# Patient Record
Sex: FEMALE | Race: WHITE | NOT HISPANIC OR LATINO | Employment: FULL TIME | URBAN - NONMETROPOLITAN AREA
[De-identification: names, ages, dates, MRNs, and addresses within clinical notes are randomized per-mention and may not be internally consistent; named-entity substitution may affect disease eponyms.]

---

## 2024-02-20 ENCOUNTER — OUTSIDE FACILITY SERVICE (OUTPATIENT)
Dept: CARDIOLOGY | Facility: CLINIC | Age: 39
End: 2024-02-20
Payer: COMMERCIAL

## 2024-02-20 ENCOUNTER — TELEPHONE (OUTPATIENT)
Dept: FAMILY MEDICINE CLINIC | Facility: CLINIC | Age: 39
End: 2024-02-20
Payer: COMMERCIAL

## 2024-02-20 PROCEDURE — 93010 ELECTROCARDIOGRAM REPORT: CPT | Performed by: INTERNAL MEDICINE

## 2024-02-20 NOTE — TELEPHONE ENCOUNTER
I called patient to make sure she was going to the er she stated she was still working then planned on going I told her if she would like to make an appt or needs anything to give us a call back

## 2024-02-20 NOTE — TELEPHONE ENCOUNTER
"Caller: Becky Grewal    Relationship to patient: Self    Best call back number: [phone]     Chief complaint: SHORTNESS OF BREATH, \"I feel like I'm going to have a heart attack\"    Patient directed to call 911 or go to their nearest emergency room.     Patient verbalized understanding: [x] Yes  [] No  If no, why?    Additional notes: PATIENT CALLED IN TO ESTABLISH CARE WITH THE OFFICE. AN APPOINTMENT WAS SCHEDULED, BUT THE PATIENT STATED THAT SHE WAS HAVING SHORTNESS OF BREATH AND THAT SHE \"COULDN'T GET A FULL BREATH IN\"    PATIENT STATED THAT SHE WOULD BE GOING TO HER NEAREST EMERGENCY ROOM AND ENDED CALL    PLEASE BE ADVISED    "

## 2024-02-22 ENCOUNTER — OFFICE VISIT (OUTPATIENT)
Dept: CARDIOLOGY | Facility: CLINIC | Age: 39
End: 2024-02-22
Payer: COMMERCIAL

## 2024-02-22 VITALS
OXYGEN SATURATION: 99 % | HEIGHT: 62 IN | BODY MASS INDEX: 33.68 KG/M2 | SYSTOLIC BLOOD PRESSURE: 150 MMHG | RESPIRATION RATE: 16 BRPM | DIASTOLIC BLOOD PRESSURE: 100 MMHG | HEART RATE: 95 BPM | WEIGHT: 183 LBS

## 2024-02-22 DIAGNOSIS — K52.9 CHRONIC DIARRHEA: ICD-10-CM

## 2024-02-22 DIAGNOSIS — I25.10 CORONARY ARTERY CALCIFICATION SEEN ON CT SCAN: ICD-10-CM

## 2024-02-22 DIAGNOSIS — R07.9 CHEST PAIN, UNSPECIFIED TYPE: Primary | ICD-10-CM

## 2024-02-22 DIAGNOSIS — R00.2 PALPITATIONS: ICD-10-CM

## 2024-02-22 DIAGNOSIS — R06.02 SHORTNESS OF BREATH: ICD-10-CM

## 2024-02-22 PROCEDURE — 99204 OFFICE O/P NEW MOD 45 MIN: CPT | Performed by: INTERNAL MEDICINE

## 2024-02-22 PROCEDURE — 93000 ELECTROCARDIOGRAM COMPLETE: CPT | Performed by: INTERNAL MEDICINE

## 2024-02-22 RX ORDER — LISINOPRIL 5 MG/1
5 TABLET ORAL DAILY
Qty: 90 TABLET | Refills: 3 | Status: SHIPPED | OUTPATIENT
Start: 2024-02-22

## 2024-02-22 RX ORDER — POTASSIUM CHLORIDE 750 MG/1
10 TABLET, FILM COATED, EXTENDED RELEASE ORAL DAILY
Qty: 90 TABLET | Refills: 3 | Status: SHIPPED | OUTPATIENT
Start: 2024-02-22

## 2024-02-22 NOTE — PROGRESS NOTES
[facility]  [facility] CARDIOLOGY  [address] [place] [address]  Dept: [phone]  Dept Fax: [phone]    Date: 02/22/2024  Patient: Becky Grewal  YOB: 1985    New Patient Office Note    Consult Reason:  Ms. Becky Grewal is a 38 y.o. female who presents to the clinic to establish care, seen for Shortness of Breath and Chest Pain.   Patient was seen at [facility] emergency room 2 days ago for chest pain.  Workup negative for pulm embolism.  Potassium was low and blood pressure was elevated.  Patient here for follow-up on chest pain.  Patient admits decreased exercise capacity limited by shortness of breath 1 flight of stairs.  Patient admits to chest pains on and off, not necessarily related to exercise, substernal, not respirophasic.  Patient admits bilateral right and left flank pains worse with deep breathing or movement.  Patient notes palpitations at rest type extra beats and palpitations lasting few seconds to a minute.  Patient denies syncope, but admits occasional lightheadedness.  Patient drinks 1/5 every day.  Patient is a smoker, usually coupled with drinking.  Patient with family history of early onset CAD from mother and father side.    The following portions of the patient's history were reviewed and updated as appropriate: allergies, current medications, past family history, past medical history, past social history, past surgical history, and problem list.    Medications:   Allergies   Allergen Reactions    Codeine Rash      Current Outpatient Medications   Medication Instructions    lisinopril (PRINIVIL,ZESTRIL) 5 mg, Oral, Daily    potassium chloride 10 MEQ CR tablet 10 mEq, Oral, Daily       Subjective  History reviewed. No pertinent past medical history.    History reviewed. No pertinent surgical history.    Family History   Problem Relation Age of Onset    Heart disease Maternal Grandfather     Heart attack Maternal Grandfather     Heart  "disease Paternal Grandfather     Heart attack Paternal Grandfather         Social History     Socioeconomic History    Marital status:    Tobacco Use    Smoking status: Every Day     Packs/day: .5     Types: Cigarettes     Start date: 1999     Passive exposure: Current    Smokeless tobacco: Never   Vaping Use    Vaping Use: Never used   Substance and Sexual Activity    Alcohol use: Yes     Comment: a fith a night whiskey    Drug use: Not Currently    Sexual activity: Defer       Objective  Vitals:    02/22/24 0913   BP: 150/100   BP Location: Right arm   Patient Position: Sitting   Pulse: 95   Resp: 16   SpO2: 99%   Weight: 83 kg (183 lb)   Height: 157.5 cm (62\")     Vitals:    02/22/24 0913   BP: 150/100   BP Location: Right arm   Patient Position: Sitting   Pulse: 95   Resp: 16   SpO2: 99%   Weight: 83 kg (183 lb)   Height: 157.5 cm (62\")        Physical Exam  Constitutional:       Appearance: Healthy appearance. Not in distress.   Eyes:      Pupils: Pupils are equal, round, and reactive to light.   HENT:    Mouth/Throat:      Mouth: Mucous membranes are moist.   Neck:      Vascular: No carotid bruit, hepatojugular reflux, JVD or JVR. JVD normal.   Pulmonary:      Effort: Pulmonary effort is normal.      Breath sounds: Normal breath sounds. No wheezing. No rhonchi. No rales.   Chest:      Chest wall: Not tender to palpatation.   Cardiovascular:      PMI at left midclavicular line. Normal rate. Regular rhythm. Normal S1 with normal intensity. Normal S2 with normal intensity.       Murmurs: There is no murmur.      No gallop.  No click. No rub.   Pulses:     Carotid: 4+ bilaterally.     Radial: 4+ bilaterally.     Popliteal: 4+ bilaterally.     Dorsalis pedis: 4+ bilaterally.  Edema:     Peripheral edema absent.   Abdominal:      General: There is no abdominal bruit.   Skin:     General: Skin is warm.   Neurological:      Mental Status: Alert and oriented to person, place and time.              Labs:  No " "results found for: \"NA\", \"K\", \"CL\", \"CO2\", \"MG\", \"BUN\", \"CREATININE\", \"CALCIUM\", \"BILITOT\", \"ALKPHOS\", \"ALT\", \"AST\", \"GLUCOSE\", \"ALBUMIN\", \"PROTEIN\"  No results found for: \"WBC\", \"HGB\", \"HCT\", \"PLT\"  No results found for: \"APTT\", \"INR\", \"PTT\"  No results found for: \"CKTOTAL\", \"TROPONINI\", \"TROPONINT\", \"CKMBINDEX\", \"BNP\"  No results found for: \"BNP\", \"PROBNP\"    No results found for: \"CHLPL\", \"TRIG\", \"HDL\", \"LDL\"  No results found for: \"TSH\", \"FREET4\"    The ASCVD Risk score (Robert MCFADDEN, et al., 2019) failed to calculate for the following reasons:    The patient has a prior MI or stroke diagnosis     CV Diagnostics:    ECG 12 Lead    Date/Time: 2/22/2024 9:29 AM  Performed by: Freddie Guillory MD    Authorized by: Freddie Guillory MD  Comparison: compared with previous ECG from 2/20/2024  Comparison to previous ECG: Left axis deviation present on this EKG  Rhythm: sinus rhythm  QRS axis: left  Other findings: left ventricular hypertrophy  Comments: Sinus rhythm with left axis deviation, LVH, nonspecific T wave change in aVL        CXR: No results found for this or any previous visit.     ECHO/MUGA: No results found for this or any previous visit.     STRESS TESTS: No results found for this or any previous visit.     CARDIAC CATH: No results found for this or any previous visit.     DEVICES: No valid procedures specified.   HOLTER: No results found for this or any previous visit.     CT/MRI:  No results found for this or any previous visit.    VASCULAR: No valid procedures specified.     Assessment and Plan  Diagnoses and all orders for this visit:    1. Chest pain, unspecified type (Primary)  Assessment & Plan:  Chest pain, on and off, substernal, associated with shortness of breath, unrelated to exercise/rest.  Risk factors: Family history, smoking, hypertension.  Low potassium from diarrhea. Plan:  Treadmill nuclear stress test for chest pain  Aggressive blood pressure control: Start lisinopril 5 mg p.o. daily  Lipid " profile next visit in 2 weeks after stopping alcohol    Orders:  -     ECG 12 Lead  -     Stress Test With Myocardial Perfusion (1 Day); Future    2. Shortness of breath  Assessment & Plan:  Multifactorial.  Likely related to ongoing hypertension, smoking/lung disease, possible CAD, other.  Discussed with patient therapeutic lifestyle changes.  Management of risk factors.   Plan:  Smoking cessation discussed extensively  Alcohol abstinence in view of liver damage, possibly cardiac  Treadmill nuclear stress test to assess exercise capacity and diagnosis of CAD  Patient counseled in regards to heart healthy, low fat/ low cholesterol/low sat diet, daily exercise for 30 minutes, low to moderate intensity, and weight loss.  Echocardiogram for possible hypertension related heart disease    Orders:  -     Adult Transthoracic Echo Complete W/ Cont if Necessary Per Protocol; Future    3. Coronary artery calcification seen on CT scan  Assessment & Plan:  Discussed with patient significance of finding.  Discussed presence of black in her heart vessels and the need to make the necessary lifestyle changes (diet exercise alcohol cessation smoking cessation).  Discussed that we are limited in medical management to prevent heart disease by abnormal liver tests, and very important to stop alcohol to be able to introduce medical therapy for cardiac prevention. Plan:  Treadmill nuclear stress test for chest pain and likely CAD    Orders:  -     Stress Test With Myocardial Perfusion (1 Day); Future    4. Palpitations  Assessment & Plan:  Type PVCs and possible SVTs.  Electrolyte disturbances to be corrected. Plan:  Holter monitor 48 hours  Potassium supplement to correct hypokalemia  Labs in 2 weeks, and introduce beta-blocker    Orders:  -     Holter Monitor - 48 Hour    5. Chronic diarrhea  -     Ambulatory Referral to Gastroenterology    Other orders  -     potassium chloride 10 MEQ CR tablet; Take 1 tablet by mouth Daily.   Dispense: 90 tablet; Refill: 3  -     lisinopril (PRINIVIL,ZESTRIL) 5 MG tablet; Take 1 tablet by mouth Daily.  Dispense: 90 tablet; Refill: 3         Return in about 2 weeks (around 3/7/2024) for Follow-up with Dr Guillory.    There are no Patient Instructions on file for this visit.    Freddie Guillory MD

## 2024-02-22 NOTE — ASSESSMENT & PLAN NOTE
Discussed with patient significance of finding.  Discussed presence of black in her heart vessels and the need to make the necessary lifestyle changes (diet exercise alcohol cessation smoking cessation).  Discussed that we are limited in medical management to prevent heart disease by abnormal liver tests, and very important to stop alcohol to be able to introduce medical therapy for cardiac prevention. Plan:  Treadmill nuclear stress test for chest pain and likely CAD

## 2024-02-22 NOTE — ASSESSMENT & PLAN NOTE
Type PVCs and possible SVTs.  Electrolyte disturbances to be corrected. Plan:  Holter monitor 48 hours  Potassium supplement to correct hypokalemia  Labs in 2 weeks, and introduce beta-blocker

## 2024-02-22 NOTE — ASSESSMENT & PLAN NOTE
Chest pain, on and off, substernal, associated with shortness of breath, unrelated to exercise/rest.  Risk factors: Family history, smoking, hypertension.  Low potassium from diarrhea. Plan:  Treadmill nuclear stress test for chest pain  Aggressive blood pressure control: Start lisinopril 5 mg p.o. daily  Lipid profile next visit in 2 weeks after stopping alcohol

## 2024-02-22 NOTE — ASSESSMENT & PLAN NOTE
Multifactorial.  Likely related to ongoing hypertension, smoking/lung disease, possible CAD, other.  Discussed with patient therapeutic lifestyle changes.  Management of risk factors.   Plan:  Smoking cessation discussed extensively  Alcohol abstinence in view of liver damage, possibly cardiac  Treadmill nuclear stress test to assess exercise capacity and diagnosis of CAD  Patient counseled in regards to heart healthy, low fat/ low cholesterol/low sat diet, daily exercise for 30 minutes, low to moderate intensity, and weight loss.  Echocardiogram for possible hypertension related heart disease

## 2024-02-28 ENCOUNTER — TELEPHONE (OUTPATIENT)
Dept: CARDIOLOGY | Facility: CLINIC | Age: 39
End: 2024-02-28
Payer: COMMERCIAL

## 2024-02-28 NOTE — TELEPHONE ENCOUNTER
Advised PT to crush the pill and eat with some applesauce, even can sprinkle on a salad. PT verbalized understanding and had no further questions.

## 2024-03-05 ENCOUNTER — TELEPHONE (OUTPATIENT)
Dept: CARDIOLOGY | Facility: CLINIC | Age: 39
End: 2024-03-05

## 2024-03-05 NOTE — TELEPHONE ENCOUNTER
----- Message from Freddie Guillory MD sent at 3/5/2024 10:00 AM EST -----  Please inform patient that her test was abnormal.  Events reported were overall benign associated with normal sinus rhythm, extra beats from the lower chamber, normal irregular beat, normal sinus beat from the upper chambers.  Patient had 1 fast heart rate from the upper chambers 11 beats, not reported as felt.  We can discuss medical therapy next visit.  Thank you!

## 2024-03-05 NOTE — TELEPHONE ENCOUNTER
informed patient that her test was abnormal.  Events reported were overall benign associated with normal sinus rhythm, extra beats from the lower chamber, normal irregular beat, normal sinus beat from the upper chambers.  Patient had 1 fast heart rate from the upper chambers 11 beats, not reported as felt.  We can discuss medical therapy next visit.

## 2024-03-05 NOTE — TELEPHONE ENCOUNTER
informed patient that her test was abnormal.  Events reported were overall benign associated with normal sinus rhythm, extra beats from the lower chamber, normal irregular beat, normal sinus beat from the upper chambers.  Patient had 1 fast heart rate from the upper chambers 11 beats, not reported as felt.  We can discuss medical therapy next visit. PT verbalized understanding and had no further questions.

## 2024-03-08 ENCOUNTER — TELEPHONE (OUTPATIENT)
Dept: CARDIOLOGY | Facility: CLINIC | Age: 39
End: 2024-03-08
Payer: COMMERCIAL

## 2024-03-08 NOTE — TELEPHONE ENCOUNTER
informed patient that her test was submaximal, as she did not reach target heart rate but normal nonetheless. PT verbalized understanding and had no further questions.

## 2024-03-08 NOTE — TELEPHONE ENCOUNTER
----- Message from Freddie Guillory MD sent at 3/7/2024  9:26 PM EST -----  Please inform patient that her showed normal function, but mild to moderate thickening of the walls and mild stiffening of the walls suggesting of hypertension effect on the heart.  Medical therapy/blood pressure control. Thank you!

## 2024-03-08 NOTE — TELEPHONE ENCOUNTER
----- Message from Freddie Guillory MD sent at 3/7/2024  4:15 PM EST -----  Please inform patient that her test was submaximal, as she did not reach target heart rate but normal nonetheless. Thank you!

## 2024-03-08 NOTE — TELEPHONE ENCOUNTER
Informed patient that her showed normal function, but mild to moderate thickening of the walls and mild stiffening of the walls suggesting of hypertension effect on the heart.  Medical therapy/blood pressure control. PT verbalized understanding and had no further questions.

## 2024-03-14 ENCOUNTER — OFFICE VISIT (OUTPATIENT)
Dept: CARDIOLOGY | Facility: CLINIC | Age: 39
End: 2024-03-14
Payer: COMMERCIAL

## 2024-03-14 VITALS
OXYGEN SATURATION: 96 % | HEART RATE: 90 BPM | SYSTOLIC BLOOD PRESSURE: 160 MMHG | HEIGHT: 62 IN | WEIGHT: 183 LBS | BODY MASS INDEX: 33.68 KG/M2 | DIASTOLIC BLOOD PRESSURE: 110 MMHG

## 2024-03-14 DIAGNOSIS — R00.2 PALPITATIONS: ICD-10-CM

## 2024-03-14 DIAGNOSIS — I25.10 CORONARY ARTERY CALCIFICATION SEEN ON CT SCAN: ICD-10-CM

## 2024-03-14 DIAGNOSIS — R06.02 SHORTNESS OF BREATH: ICD-10-CM

## 2024-03-14 DIAGNOSIS — R07.9 CHEST PAIN, UNSPECIFIED TYPE: Primary | ICD-10-CM

## 2024-03-14 RX ORDER — BISOPROLOL FUMARATE 5 MG/1
5 TABLET, FILM COATED ORAL DAILY
Qty: 90 TABLET | Refills: 3 | Status: SHIPPED | OUTPATIENT
Start: 2024-03-14

## 2024-03-14 NOTE — PROGRESS NOTES
Venipuncture Blood Specimen Collection  Venipuncture performed in clinic by Jovi Saxena MA with good hemostasis. Patient tolerated the procedure well without complications.   03/14/24   Jovi Saxena MA

## 2024-03-14 NOTE — PROGRESS NOTES
"[facility]  [facility] CARDIOLOGY  [address] [place] [address]  Dept: [phone]  Dept Fax: [phone]    Date: 03/14/2024  Patient: Becky Grewal  YOB: 1985    Follow Up Office Visit Note    Interval Follow-up  Ms. Becky Grewal is a 38 y.o. female who is here for follow-up on Shortness of Breath and Chest Pain.    Subjective   Patient overall feeling better.  Slowed down on alcohol use; try to quit initially but had severe withdrawals so now decreasing alcohol intake progressively.  Breathing better overall.  Less frequent chest pains with infrequent palpitations as well.  Exercise capacity overall improved.   Patient denies orthopnea, PND, lightheadedness, syncope or medications side-effects.    The following portions of the patient's history were reviewed and updated as appropriate: allergies, current medications, past family history, past medical history, past social history, past surgical history, and problem list.    Medications:   Allergies   Allergen Reactions    Codeine Rash      Current Outpatient Medications   Medication Instructions    bisoprolol (ZEBETA) 5 mg, Oral, Daily    lisinopril (PRINIVIL,ZESTRIL) 5 mg, Oral, Daily    potassium chloride 10 MEQ CR tablet 10 mEq, Oral, Daily       Tobacco Use: High Risk (3/14/2024)    Patient History     Smoking Tobacco Use: Every Day     Smokeless Tobacco Use: Never     Passive Exposure: Current        Objective  Vitals:    03/14/24 1133   BP: (!) 160/110   BP Location: Left arm   Patient Position: Sitting   Cuff Size: Adult   Pulse: 90   SpO2: 96%   Weight: 83 kg (183 lb)   Height: 157.5 cm (62\")   PainSc: 0-No pain      Vitals:    03/14/24 1133   BP: (!) 160/110   BP Location: Left arm   Patient Position: Sitting   Cuff Size: Adult   Pulse: 90   SpO2: 96%   Weight: 83 kg (183 lb)   Height: 157.5 cm (62\")          Physical Exam  Constitutional:       Appearance: Not in distress.   Neck:      Vascular: " "JVD normal.   Pulmonary:      Breath sounds: Normal breath sounds.   Cardiovascular:      Normal rate. Regular rhythm.      Murmurs: There is no murmur.   Pulses:     Intact distal pulses.   Edema:     Peripheral edema absent.   Neurological:      Mental Status: Alert.              Diagnostic Data  No results found for: \"NA\", \"K\", \"CL\", \"CO2\", \"MG\", \"BUN\", \"CREATININE\", \"CALCIUM\", \"BILITOT\", \"ALKPHOS\", \"ALT\", \"AST\", \"GLUCOSE\", \"ALBUMIN\", \"PROTEIN\"  No results found for: \"WBC\", \"HGB\", \"HCT\", \"PLT\"  No results found for: \"APTT\", \"INR\", \"PTT\"  No results found for: \"CKTOTAL\", \"TROPONINI\", \"TROPONINT\", \"CKMBINDEX\", \"BNP\"  No results found for: \"BNP\", \"PROBNP\"    No results found for: \"CHLPL\", \"TRIG\", \"HDL\", \"LDL\"  No results found for: \"TSH\", \"FREET4\"    CV Diagnostics:  Procedures    CXR: No results found for this or any previous visit.     ECHO/MUGA: Results for orders placed in visit on 03/07/24    Adult Transthoracic Echo Complete W/ Cont if Necessary Per Protocol    Interpretation Summary    Normal LV size and function, ejection fraction estimated by 2D, 56 - 60%.    Left ventricular wall thickness is consistent with mild to moderate concentric hypertrophy.    Left ventricular diastolic function is consistent with (grade I) impaired relaxation.    Estimated right ventricular systolic pressure from tricuspid regurgitation is normal (<35 mmHg).     STRESS TESTS: Results for orders placed in visit on 03/07/24    Stress Test With Myocardial Perfusion (1 Day)    Interpretation Summary    Impressions are consistent with a submaximal low risk treadmill nuclear stress test. Myocardial perfusion imaging indicates a normal myocardial perfusion study with no evidence of ischemia.    Left ventricular ejection fraction is normal (Calculated EF = 58%).    Moderate risk for ischemic heart disease by Duke risk score in view of decreased exercise capacity and ST-T changes during stress.    Electrocardiographically, findings " consistent with an indeterminate ECG stress test, with baseline ST depression in the inferior leads, worsening with stress without reaching test significance for abnormality, returning to baseline and recovery.    Breast attenuation artifact is present.     CARDIAC CATH: No results found for this or any previous visit.     DEVICES: No valid procedures specified.   HOLTER: Results for orders placed in visit on 02/22/24    Holter Monitor - 48 Hour    Interpretation Summary    An abnormal monitor study.    Patient reported 128 events of chest pain and palpitations, associated with normal sinus rhythm, PVCs, sinus arrhythmia and sinus tachycardia.    There was 1 event of atrial tachycardia 11 beats, average rate 150 bpm, max rate 177 bpm, not associated with symptoms    There was no atrial fibrillation, VT, cardiac pauses or heart blocks detected.     CT/MRI:  No results found for this or any previous visit.    VASCULAR: No valid procedures specified.     Assessment and Plan  Diagnoses and all orders for this visit:    1. Chest pain, unspecified type (Primary)  Assessment & Plan:  Chest pain, on and off, substernal, associated with shortness of breath, unrelated to exercise/rest.  Improved with blood pressure control.  Risk factors: Family history, smoking, hypertension.  Low potassium from diarrhea.  Treadmill nuclear stress test negative for ischemia nonetheless moderate risk by Frey's criteria and EKG changes.  Plan:  Aggressive blood pressure control: Start bisoprolol 5 mg p.o. daily  Labs/lipid profile and uptitrate lisinopril      2. Coronary artery calcification seen on CT scan  Assessment & Plan:  Patient understands significance of finding, suggesting atherosclerotic plaques in her heart vessels and the need to make the necessary lifestyle changes (diet exercise alcohol cessation smoking cessation).Limited in medical management to prevent heart disease by abnormal liver tests, and alcohol abuse.  Patient  weaning herself off alcohol and stress test negative. Plan:  Follow-up LFTs and lipid profile today  Statins if indicated/LFTs normal      3. Shortness of breath  Assessment & Plan:  Multifactorial.  Improving with blood pressure control.  Likely related to hypertension, smoking/lung disease, other.  Revisited with patient therapeutic lifestyle changes.  Management of risk factors.  Stress test negative.  Echo with mild to moderate LVH concerning in view of age.  Plan:  Smoking cessation discussed extensively  Alcohol abstinence in view of liver damage  Aggressive blood pressure control  Workup of LVH, likely secondary to hypertension; nonetheless rule out hypertrophic cardiomyopathy, infiltrative cardiomyopathy, other.    Orders:  -     CBC & Differential  -     proBNP  -     Magnesium  -     Comprehensive Metabolic Panel  -     Lipid Panel  -     Immunofixation electrophoresis    4. Palpitations  Assessment & Plan:  Type PVCs and possible SVTs.  Symptomatic, nonetheless improved.  Electrolyte disturbances being corrected.  Atrial tachycardia on Holter monitor.  Symptoms related to PVCs and sinus tachycardia essentially.  Plan:  Labs today  Start bisoprolol 5 mg p.o. daily  Aggressive blood pressure control  Continue potassium supplementation in view of diarrhea as needed based on lab    Orders:  -     bisoprolol (ZEBeta) 5 MG tablet; Take 1 tablet by mouth Daily.  Dispense: 90 tablet; Refill: 3         Return in about 2 months (around 5/14/2024) for Follow-up with Dr Guillory.    There are no Patient Instructions on file for this visit.    Freddie Guillory MD

## 2024-03-14 NOTE — ASSESSMENT & PLAN NOTE
Chest pain, on and off, substernal, associated with shortness of breath, unrelated to exercise/rest.  Improved with blood pressure control.  Risk factors: Family history, smoking, hypertension.  Low potassium from diarrhea.  Treadmill nuclear stress test negative for ischemia nonetheless moderate risk by Frey's criteria and EKG changes.  Plan:  Aggressive blood pressure control: Start bisoprolol 5 mg p.o. daily  Labs/lipid profile and uptitrate lisinopril

## 2024-03-14 NOTE — ASSESSMENT & PLAN NOTE
Type PVCs and possible SVTs.  Symptomatic, nonetheless improved.  Electrolyte disturbances being corrected.  Atrial tachycardia on Holter monitor.  Symptoms related to PVCs and sinus tachycardia essentially.  Plan:  Labs today  Start bisoprolol 5 mg p.o. daily  Aggressive blood pressure control  Continue potassium supplementation in view of diarrhea as needed based on lab

## 2024-03-14 NOTE — ASSESSMENT & PLAN NOTE
Multifactorial.  Improving with blood pressure control.  Likely related to hypertension, smoking/lung disease, other.  Revisited with patient therapeutic lifestyle changes.  Management of risk factors.  Stress test negative.  Echo with mild to moderate LVH concerning in view of age.  Plan:  Smoking cessation discussed extensively  Alcohol abstinence in view of liver damage  Aggressive blood pressure control  Workup of LVH, likely secondary to hypertension; nonetheless rule out hypertrophic cardiomyopathy, infiltrative cardiomyopathy, other.

## 2024-03-14 NOTE — ASSESSMENT & PLAN NOTE
Patient understands significance of finding, suggesting atherosclerotic plaques in her heart vessels and the need to make the necessary lifestyle changes (diet exercise alcohol cessation smoking cessation).Limited in medical management to prevent heart disease by abnormal liver tests, and alcohol abuse.  Patient weaning herself off alcohol and stress test negative. Plan:  Follow-up LFTs and lipid profile today  Statins if indicated/LFTs normal

## 2024-03-15 ENCOUNTER — TELEPHONE (OUTPATIENT)
Dept: CARDIOLOGY | Facility: CLINIC | Age: 39
End: 2024-03-15
Payer: COMMERCIAL

## 2024-03-15 LAB
ALBUMIN SERPL ELPH-MCNC: 3.7 G/DL (ref 2.9–4.4)
ALBUMIN SERPL-MCNC: 4.4 G/DL (ref 3.9–4.9)
ALBUMIN/GLOB SERPL: 1.1 {RATIO} (ref 0.7–1.7)
ALBUMIN/GLOB SERPL: 1.5 {RATIO} (ref 1.2–2.2)
ALP SERPL-CCNC: 89 IU/L (ref 44–121)
ALPHA1 GLOB SERPL ELPH-MCNC: 0.3 G/DL (ref 0–0.4)
ALPHA2 GLOB SERPL ELPH-MCNC: 0.7 G/DL (ref 0.4–1)
ALT SERPL-CCNC: 147 IU/L (ref 0–32)
AST SERPL-CCNC: 154 IU/L (ref 0–40)
B-GLOBULIN SERPL ELPH-MCNC: 1.3 G/DL (ref 0.7–1.3)
BASOPHILS # BLD AUTO: 0.1 X10E3/UL (ref 0–0.2)
BASOPHILS NFR BLD AUTO: 1 %
BILIRUB SERPL-MCNC: 1.5 MG/DL (ref 0–1.2)
BUN SERPL-MCNC: 11 MG/DL (ref 6–24)
BUN/CREAT SERPL: 15 (ref 9–23)
CALCIUM SERPL-MCNC: 9.3 MG/DL (ref 8.7–10.2)
CHLORIDE SERPL-SCNC: 98 MMOL/L (ref 96–106)
CHOLEST SERPL-MCNC: 261 MG/DL (ref 100–199)
CO2 SERPL-SCNC: 24 MMOL/L (ref 20–29)
CREAT SERPL-MCNC: 0.72 MG/DL (ref 0.57–1)
EGFRCR SERPLBLD CKD-EPI 2021: 108 ML/MIN/1.73
EOSINOPHIL # BLD AUTO: 0.2 X10E3/UL (ref 0–0.4)
EOSINOPHIL NFR BLD AUTO: 4 %
ERYTHROCYTE [DISTWIDTH] IN BLOOD BY AUTOMATED COUNT: 15.5 % (ref 11.7–15.4)
GAMMA GLOB SERPL ELPH-MCNC: 1.2 G/DL (ref 0.4–1.8)
GLOBULIN SER CALC-MCNC: 2.9 G/DL (ref 1.5–4.5)
GLOBULIN SER-MCNC: 3.5 G/DL (ref 2.2–3.9)
GLUCOSE SERPL-MCNC: 130 MG/DL (ref 70–99)
HCT VFR BLD AUTO: 46 % (ref 34–46.6)
HDLC SERPL-MCNC: 70 MG/DL
HGB BLD-MCNC: 15.9 G/DL (ref 11.1–15.9)
IGA SERPL-MCNC: 256 MG/DL (ref 87–352)
IGG SERPL-MCNC: 1167 MG/DL (ref 586–1602)
IGM SERPL-MCNC: 70 MG/DL (ref 26–217)
IMM GRANULOCYTES # BLD AUTO: 0 X10E3/UL (ref 0–0.1)
IMM GRANULOCYTES NFR BLD AUTO: 0 %
INTERPRETATION SERPL IEP-IMP: NORMAL
LABORATORY COMMENT REPORT: NORMAL
LDLC SERPL CALC-MCNC: 154 MG/DL (ref 0–99)
LYMPHOCYTES # BLD AUTO: 2 X10E3/UL (ref 0.7–3.1)
LYMPHOCYTES NFR BLD AUTO: 31 %
M PROTEIN SERPL ELPH-MCNC: NORMAL G/DL
MAGNESIUM SERPL-MCNC: 1.7 MG/DL (ref 1.6–2.3)
MCH RBC QN AUTO: 34.6 PG (ref 26.6–33)
MCHC RBC AUTO-ENTMCNC: 34.6 G/DL (ref 31.5–35.7)
MCV RBC AUTO: 100 FL (ref 79–97)
MONOCYTES # BLD AUTO: 0.5 X10E3/UL (ref 0.1–0.9)
MONOCYTES NFR BLD AUTO: 8 %
NEUTROPHILS # BLD AUTO: 3.7 X10E3/UL (ref 1.4–7)
NEUTROPHILS NFR BLD AUTO: 56 %
NT-PROBNP SERPL-MCNC: 76 PG/ML (ref 0–130)
PLATELET # BLD AUTO: 128 X10E3/UL (ref 150–450)
POTASSIUM SERPL-SCNC: 3.4 MMOL/L (ref 3.5–5.2)
PROT SERPL-MCNC: 7.2 G/DL (ref 6–8.5)
PROT SERPL-MCNC: 7.3 G/DL (ref 6–8.5)
RBC # BLD AUTO: 4.59 X10E6/UL (ref 3.77–5.28)
SODIUM SERPL-SCNC: 139 MMOL/L (ref 134–144)
TRIGL SERPL-MCNC: 207 MG/DL (ref 0–149)
VLDLC SERPL CALC-MCNC: 37 MG/DL (ref 5–40)
WBC # BLD AUTO: 6.4 X10E3/UL (ref 3.4–10.8)

## 2024-03-15 RX ORDER — POTASSIUM CHLORIDE 750 MG/1
10 TABLET, FILM COATED, EXTENDED RELEASE ORAL DAILY
Qty: 90 TABLET | Refills: 3 | Status: SHIPPED | OUTPATIENT
Start: 2024-03-15

## 2024-03-15 NOTE — TELEPHONE ENCOUNTER
Left VM to inform patient that her blood work was abnormal.  The liver tests are abnormal likely from alcohol so we need her to wean off as fast as possible.    The potassium is low also so needs to restart on potassium supplements and I sent the prescription to her pharmacy.    Her cholesterol is also markedly elevated, but for now we will work on lifestyle changes until the liver gets better (to  in regards to heart healthy, low fat/ low cholesterol/low sat diet, fresh fruit vegetables, daily exercise for 30 minutes, low to moderate intensity, and weight loss.).    Rest of the labs including magnesium and heart test appears to be normal.

## 2024-03-20 ENCOUNTER — TELEPHONE (OUTPATIENT)
Dept: CARDIOLOGY | Facility: CLINIC | Age: 39
End: 2024-03-20
Payer: COMMERCIAL

## 2024-03-20 NOTE — TELEPHONE ENCOUNTER
FORM ONLY RECEIVED FIRST PAGE   REQUESTING BOTH PAGES       [phone] - FAX NUMBER    REFERENCE NUMBER - [id number]

## 2024-03-26 ENCOUNTER — TELEPHONE (OUTPATIENT)
Dept: CARDIOLOGY | Facility: CLINIC | Age: 39
End: 2024-03-26
Payer: COMMERCIAL

## 2024-03-26 NOTE — TELEPHONE ENCOUNTER
informed patient that her test for abnormal blood protein was normal. PT verbalized understanding and had no further questions.

## 2024-03-26 NOTE — TELEPHONE ENCOUNTER
----- Message from Freddie Guillory MD sent at 3/25/2024  6:11 PM EDT -----  Please inform patient that her test for abnormal blood protein was normal. Thank you!

## 2024-03-29 ENCOUNTER — TELEPHONE (OUTPATIENT)
Dept: CARDIOLOGY | Facility: CLINIC | Age: 39
End: 2024-03-29
Payer: COMMERCIAL

## 2024-03-29 NOTE — TELEPHONE ENCOUNTER
TRAZADONE -NEEDS REFILL OF, PT HAS BEEN OCCASIONALLY TAKING IT, DOESN'T HAVE PRIMARY CARE    MENTIONED THAT SHE HAS NOT BEEN SLEEPING AND STOPPED DRINKING - AS DOCTOR HAD ADVISED    PLEASE ADVISE PT

## 2024-03-31 RX ORDER — TRAZODONE HYDROCHLORIDE 50 MG/1
50 TABLET ORAL NIGHTLY
Qty: 30 TABLET | Refills: 1 | Status: SHIPPED | OUTPATIENT
Start: 2024-03-31

## 2024-04-09 ENCOUNTER — TELEPHONE (OUTPATIENT)
Dept: CARDIOLOGY | Facility: CLINIC | Age: 39
End: 2024-04-09
Payer: COMMERCIAL

## 2024-04-11 ENCOUNTER — TELEPHONE (OUTPATIENT)
Dept: CARDIOLOGY | Facility: CLINIC | Age: 39
End: 2024-04-11
Payer: COMMERCIAL

## 2024-04-11 NOTE — TELEPHONE ENCOUNTER
----- Message from Freddie Guillory MD sent at 4/11/2024  9:23 AM EDT -----  Please inform patient that her genetic test for amyloidosis was negative. Thank you!

## 2024-04-16 ENCOUNTER — TELEPHONE (OUTPATIENT)
Dept: CARDIOLOGY | Facility: CLINIC | Age: 39
End: 2024-04-16
Payer: COMMERCIAL

## 2024-04-16 NOTE — TELEPHONE ENCOUNTER
Spoke with PT over the phone and reminded her that she needs to come in for 4 weeks follow-up - CMP. PT verbalized understanding and had no further questions.

## 2025-02-11 ENCOUNTER — OFFICE VISIT (OUTPATIENT)
Dept: OBSTETRICS AND GYNECOLOGY | Facility: CLINIC | Age: 40
End: 2025-02-11
Payer: COMMERCIAL

## 2025-02-11 VITALS
DIASTOLIC BLOOD PRESSURE: 90 MMHG | SYSTOLIC BLOOD PRESSURE: 140 MMHG | WEIGHT: 173 LBS | BODY MASS INDEX: 31.83 KG/M2 | HEIGHT: 62 IN

## 2025-02-11 DIAGNOSIS — I10 ESSENTIAL HYPERTENSION: ICD-10-CM

## 2025-02-11 DIAGNOSIS — N39.3 SUI (STRESS URINARY INCONTINENCE, FEMALE): ICD-10-CM

## 2025-02-11 DIAGNOSIS — Z20.2 POSSIBLE EXPOSURE TO STI: ICD-10-CM

## 2025-02-11 DIAGNOSIS — K64.4 SKIN TAG OF PERIANAL REGION: ICD-10-CM

## 2025-02-11 DIAGNOSIS — N64.52 BILATERAL NIPPLE DISCHARGE: ICD-10-CM

## 2025-02-11 DIAGNOSIS — N93.9 ABNORMAL UTERINE BLEEDING (AUB): ICD-10-CM

## 2025-02-11 DIAGNOSIS — Z01.419 ENCOUNTER FOR WELL WOMAN EXAM WITH ROUTINE GYNECOLOGICAL EXAM: Primary | ICD-10-CM

## 2025-02-11 DIAGNOSIS — E11.9 TYPE 2 DIABETES MELLITUS WITHOUT COMPLICATION, WITHOUT LONG-TERM CURRENT USE OF INSULIN: ICD-10-CM

## 2025-02-11 RX ORDER — LANCETS
EACH MISCELLANEOUS
COMMUNITY
Start: 2024-12-03

## 2025-02-11 RX ORDER — HYDROXYZINE HYDROCHLORIDE 50 MG/1
50 TABLET, FILM COATED ORAL NIGHTLY PRN
COMMUNITY

## 2025-02-11 RX ORDER — ALBUTEROL SULFATE 90 UG/1
2 INHALANT RESPIRATORY (INHALATION)
COMMUNITY
Start: 2024-10-11

## 2025-02-11 RX ORDER — BLOOD-GLUCOSE METER
EACH MISCELLANEOUS
COMMUNITY
Start: 2024-10-18

## 2025-02-11 RX ORDER — BLOOD SUGAR DIAGNOSTIC
STRIP MISCELLANEOUS
COMMUNITY

## 2025-02-11 RX ORDER — METFORMIN HYDROCHLORIDE 500 MG/1
500 TABLET, EXTENDED RELEASE ORAL
COMMUNITY

## 2025-02-11 RX ORDER — ROSUVASTATIN CALCIUM 10 MG/1
10 TABLET, COATED ORAL DAILY
COMMUNITY
Start: 2025-01-08

## 2025-02-11 RX ORDER — LOSARTAN POTASSIUM 25 MG/1
25 TABLET ORAL DAILY
COMMUNITY
Start: 2024-10-14

## 2025-02-11 NOTE — PROGRESS NOTES
Subjective   Chief Complaint   Patient presents with    Annual Exam    Contraception     Options for BC     Becky Grewal is a 39 y.o. year old  presenting to be seen for her annual exam. She has not been seen by gynecology for a while, and has recently been diagnosed with hypertension, diabetes, and high cholesterol. She would also like to discuss contraception.     SEXUAL Hx:  She is currently sexually active.  In the past year there there has been ONE new sexual partner.    Condoms are never used.  She would like to be screened for STD's at today's exam.  Current birth control method: spermicide.  She is not happy with her current method of contraception and does want to discuss alternative methods of contraception.  MENSTRUAL Hx:  Patient's last menstrual period was 2025.  In the past 6 months her cycles have been regular, predictable and occur every 3 weeks .  Her menstrual flow is typically moderately heavy.   Each month on average there are roughly 0 day(s) of very heavy flow.  She reports heavier periods than they used to be, more clots, and more frequent. Total of typically 4-5 days of flow but last period lasted 8 days  Intermenstrual bleeding is absent.    Post-coital bleeding is absent.  Dysmenorrhea: moderate and is not affecting her activities of daily living  PMS: moderate and is not affecting her activities of daily living  Her cycles ARE a source of concern for her that she wishes to discuss today.  HEALTH Hx:  She exercises regularly: no (but is planning to start exercising more). She has been walking when in her office and making more effort to stand up and move around.   She wears her seat belt: yes.  She has concerns about domestic violence: no.  OTHER THINGS SHE WANTS TO DISCUSS TODAY:  She is worried about thinning hair that is more significant than it used to be. This started in about - she did, incidentally, have Covid prior to that. PCP did lab workup that was essentially  "normal (thyroid, etc).     The following portions of the patient's history were reviewed and updated as appropriate:problem list, current medications, allergies, past family history, past medical history, past social history, and past surgical history.    Social History    Tobacco Use      Smoking status: Every Day        Packs/day: 0.50        Years: 0.5 packs/day for 26.1 years (13.1 ttl pk-yrs)        Types: Cigarettes        Start date: 1999        Passive exposure: Current      Smokeless tobacco: Never    Past Medical History:   Diagnosis Date    Anxiety     Asthma     Diabetes mellitus     Hyperlipemia     Hypertension      Past Surgical History:   Procedure Laterality Date    APPENDECTOMY      BREAST AUGMENTATION      CHOLECYSTECTOMY      COSMETIC SURGERY      tummy tuck and lipo    D & C WITH SUCTION      OVARIAN CYST SURGERY      TONSILLECTOMY           Review of Systems   Constitutional: Negative.  Negative for appetite change and diaphoresis.   Respiratory: Negative.     Cardiovascular: Negative.    Gastrointestinal:  Negative for abdominal distention, blood in stool, GERD and indigestion.   Endocrine: Negative.    Genitourinary:  Positive for breast discharge, menstrual problem and urinary incontinence. Negative for breast lump, breast pain, dysuria and hematuria.   Skin: Negative.           Objective   /90   Ht 157.5 cm (62\")   Wt 78.5 kg (173 lb)   LMP 01/27/2025   BMI 31.64 kg/m²     Physical Exam  Vitals and nursing note reviewed. Exam conducted with a chaperone present.   Constitutional:       Appearance: Normal appearance.   Cardiovascular:      Rate and Rhythm: Normal rate and regular rhythm.      Heart sounds: Normal heart sounds.   Pulmonary:      Effort: Pulmonary effort is normal.      Breath sounds: Normal breath sounds.   Chest:   Breasts:     Right: Nipple discharge present.      Left: Nipple discharge present.      Comments: General dense tissue bilaterally  Abdominal:      " Palpations: Abdomen is soft.   Genitourinary:     General: Normal vulva.      Exam position: Lithotomy position.      Vagina: Normal.      Cervix: Normal.      Uterus: Normal.       Adnexa: Right adnexa normal and left adnexa normal.      Rectum: Normal.      Comments: Able to perform Kegel squeeze very well  Digital rectal exam not done but appears normal visually  Neurological:      Mental Status: She is alert and oriented to person, place, and time.   Psychiatric:         Mood and Affect: Mood normal.         Behavior: Behavior normal.         Thought Content: Thought content normal.         Judgment: Judgment normal.            Diagnoses and all orders for this visit:    1. Encounter for well woman exam with routine gynecological exam (Primary)  -     LIQUID-BASED PAP SMEAR WITH HPV GENOTYPING REGARDLESS OF INTERPRETATION ([facility]); Future  -     LIQUID-BASED PAP SMEAR WITH HPV GENOTYPING REGARDLESS OF INTERPRETATION ([facility])    2. Essential hypertension    3. Type 2 diabetes mellitus without complication, without long-term current use of insulin    4. Possible exposure to STI  -     LEUKORRHEA PANEL, [facility](C.TRACHOMATIS, N.GONORRHOEAE, T.VAGINALIS) - , Cervix, Endocervix; Future  -     LEUKORRHEA PANEL, [facility](C.TRACHOMATIS, N.GONORRHOEAE, T.VAGINALIS) - , Cervix, Endocervix    5. Bilateral nipple discharge  -     Mammo Diagnostic Digital Tomosynthesis Bilateral With CAD; Future    6. ÓSCAR (stress urinary incontinence, female)    7. Skin tag of perianal region  -     Ambulatory Referral to Dermatology    8. Abnormal uterine bleeding (AUB)  -     US Non-ob Transvaginal; Future    Other orders  -     Lactic Ac-Citric Ac-Pot Bitart (PHEXXI) 1.8-1-0.4 % gel; Insert 1 applicator into the vagina 1 (One) Time As Needed (sexual intercourse) for up to 40 doses.  Dispense: 10 applicator; Refill: 3    Pap and STD testing was done today.  If she does not receive the results of the Pap within 2  weeks  time, she was instructed to call to find out the results.  I explained to Becky that the recommendations for Pap smear interval in a low risk patient's has lengthened to 3 years time.  I encouraged her to be seen yearly for a full physical exam including breast and pelvic exam even during the off years when PAP's will not be performed. She denies history of abnormal pap smears.     She reports her mother had breast ca diagnosed at age 46- unsure if any genetic testing done. Father had colon ca at age 49- she has not had colonoscopy for some years, but not concerned with this right now. Mammogram ordered today for bilateral nipple discharge as well as baseline screening.    Education given regarding options for contraception, including barrier methods, injectable contraception, IUD placement, oral contraceptives. Given her smoking history, age, and co-morbidities (liver enzymes elevated) cannot recommend any hormonal contraception at this time. She has had a bad experience with Cu IUD in the past (broke off, heavy bleeding, etc) so would not like that. Discussed if liver impairment improves can revisit idea of POP or Nexplanon, but until then would recommend condoms, spermacide, or Phexxi. Rx sent in for Phexxi.     With regards to changes in menses- will have her back for ultrasound to rule out any structural concerns. Discussed this could be related to perimenopausal changes, co-morbidities, or could be a sign of something more worrisome. Will work up further after ultrasound.     Start Phexxi.  A new prescription(s) was created today    The importance of keeping all planned follow-up and taking all medications as prescribed was emphasized.    Today I discussed with Becky the total recommended calcium intake for a premenopausal female is 1000 mg.  Ideally this should be from dietary sources.  I reviewed calcium content in various foods including milk, fortified orange juice and yogurt.  If she cannot get  sufficient calcium through dietary means, it is recommended to supplement with either a multivitamin or calcium to reach her daily goal.  I also reviewed the difference in the bioavailability of calcium carbonate and calcium citrate containing supplements and the importance of taking calcium carbonate containing products with food.  Finally, vitamin D's role in calcium absorption was reviewed and a total daily vitamin D intake of 800 units was recommended.    I discussed with Becky that she may be behind on needed vaccinations for Influenza and COVID booster / COVID bivalent booster.  She may be able to obtain these vaccinations at her local pharmacy OR speak about obtaining them with her primary care.  If she does obtain her vaccines, I have asked Becky to let us know the date each vaccine was obtained so that her medical record could be updated in our system.    New Medications Ordered This Visit   Medications    Lactic Ac-Citric Ac-Pot Bitart (PHEXXI) 1.8-1-0.4 % gel     Sig: Insert 1 applicator into the vagina 1 (One) Time As Needed (sexual intercourse) for up to 40 doses.     Dispense:  10 applicator     Refill:  3            Return in about 1 year (around 2/11/2026) for Annual physical (can go to [place] if she would like to).    Gabby Verma, APRN  February 11, 2025

## 2025-02-13 LAB — REF LAB TEST METHOD: NORMAL

## 2025-02-14 ENCOUNTER — PATIENT ROUNDING (BHMG ONLY) (OUTPATIENT)
Dept: OBSTETRICS AND GYNECOLOGY | Facility: CLINIC | Age: 40
End: 2025-02-14
Payer: COMMERCIAL

## 2025-02-14 NOTE — PROGRESS NOTES
My name is [email or web address]    I am with NIVIA LAWLER  [facility]  [address]  [place] [address] [phone]    I want to officially welcome you to our practice and ask about your recent visit.    Tell me about your visit with us.  What things went well?    We're always looking for ways to make our patients' experiences even better.  Do you have recommendations on ways we may improve?    Overall were you satisfied with your first visit to our practice?    I appreciate you taking the time to answer a few questions today.  Is there anything else I can do for you?    Thank you, and have a great day.

## 2025-05-13 ENCOUNTER — RESULTS FOLLOW-UP (OUTPATIENT)
Facility: HOSPITAL | Age: 40
End: 2025-05-13
Payer: COMMERCIAL

## 2025-05-13 ENCOUNTER — TELEPHONE (OUTPATIENT)
Dept: CARDIOLOGY | Facility: CLINIC | Age: 40
End: 2025-05-13
Payer: COMMERCIAL

## 2025-05-13 LAB
NCCN CRITERIA FLAG: ABNORMAL
TYRER CUZICK SCORE: 15

## 2025-05-13 NOTE — TELEPHONE ENCOUNTER
ATTEMPTED TO LVM FOR PT TO CALL BACK AND SCHEDULE FOLLOW UP - REGARDING REFILL REQUEST     LISINOPRIL

## 2025-05-14 ENCOUNTER — HOSPITAL ENCOUNTER (OUTPATIENT)
Facility: HOSPITAL | Age: 40
Discharge: HOME | End: 2025-05-14
Payer: COMMERCIAL

## 2025-05-14 DIAGNOSIS — N64.52 BILATERAL NIPPLE DISCHARGE: ICD-10-CM

## 2025-05-14 PROCEDURE — 77066 DX MAMMO INCL CAD BI: CPT | Performed by: RADIOLOGY

## 2025-05-14 PROCEDURE — 77066 DX MAMMO INCL CAD BI: CPT

## 2025-05-14 PROCEDURE — 76642 ULTRASOUND BREAST LIMITED: CPT | Performed by: RADIOLOGY

## 2025-05-14 PROCEDURE — 76642 ULTRASOUND BREAST LIMITED: CPT

## 2025-05-14 PROCEDURE — 77062 BREAST TOMOSYNTHESIS BI: CPT | Performed by: RADIOLOGY

## 2025-05-14 PROCEDURE — G0279 TOMOSYNTHESIS, MAMMO: HCPCS

## 2025-05-20 ENCOUNTER — DOCUMENTATION (OUTPATIENT)
Dept: GENETICS | Facility: HOSPITAL | Age: 40
End: 2025-05-20
Payer: COMMERCIAL

## 2025-05-20 NOTE — PROGRESS NOTES
This patient recently completed the CARE risk assessment for a mammogram appointment. Based on the patient's responses, NCCN criteria for genetic testing was met. At the time of the assessment, the patient was provided with both written and video educational materials regarding genetic testing.    Navigator follow-up:    I have attempted to contact the patient via [employer] message to discuss the risk assessment results. The patient has not yet responded. My contact information was included in the [employer] message.